# Patient Record
Sex: MALE | Race: WHITE | NOT HISPANIC OR LATINO | URBAN - METROPOLITAN AREA
[De-identification: names, ages, dates, MRNs, and addresses within clinical notes are randomized per-mention and may not be internally consistent; named-entity substitution may affect disease eponyms.]

---

## 2020-06-07 ENCOUNTER — EMERGENCY (EMERGENCY)
Facility: HOSPITAL | Age: 35
LOS: 1 days | Discharge: HOME | End: 2020-06-07
Attending: EMERGENCY MEDICINE
Payer: COMMERCIAL

## 2020-06-07 VITALS
HEART RATE: 72 BPM | SYSTOLIC BLOOD PRESSURE: 146 MMHG | TEMPERATURE: 99 F | DIASTOLIC BLOOD PRESSURE: 101 MMHG | RESPIRATION RATE: 12 BRPM | OXYGEN SATURATION: 100 %

## 2020-06-07 VITALS
OXYGEN SATURATION: 99 % | TEMPERATURE: 99 F | DIASTOLIC BLOOD PRESSURE: 120 MMHG | HEART RATE: 77 BPM | RESPIRATION RATE: 19 BRPM | WEIGHT: 300.05 LBS | HEIGHT: 68 IN | SYSTOLIC BLOOD PRESSURE: 196 MMHG

## 2020-06-07 LAB
ALBUMIN SERPL ELPH-MCNC: 4.5 G/DL — SIGNIFICANT CHANGE UP (ref 3.3–5)
ALP SERPL-CCNC: 114 U/L — SIGNIFICANT CHANGE UP (ref 40–120)
ALT FLD-CCNC: 21 U/L — SIGNIFICANT CHANGE UP (ref 10–45)
ANION GAP SERPL CALC-SCNC: 13 MMOL/L — SIGNIFICANT CHANGE UP (ref 5–17)
APTT BLD: 33.4 SEC — SIGNIFICANT CHANGE UP (ref 27.5–36.3)
AST SERPL-CCNC: 15 U/L — SIGNIFICANT CHANGE UP (ref 10–40)
BASOPHILS # BLD AUTO: 0.07 K/UL — SIGNIFICANT CHANGE UP (ref 0–0.2)
BASOPHILS NFR BLD AUTO: 0.9 % — SIGNIFICANT CHANGE UP (ref 0–2)
BILIRUB SERPL-MCNC: 0.6 MG/DL — SIGNIFICANT CHANGE UP (ref 0.2–1.2)
BUN SERPL-MCNC: 11 MG/DL — SIGNIFICANT CHANGE UP (ref 7–23)
CALCIUM SERPL-MCNC: 9 MG/DL — SIGNIFICANT CHANGE UP (ref 8.4–10.5)
CHLORIDE SERPL-SCNC: 102 MMOL/L — SIGNIFICANT CHANGE UP (ref 96–108)
CO2 SERPL-SCNC: 22 MMOL/L — SIGNIFICANT CHANGE UP (ref 22–31)
CREAT SERPL-MCNC: 0.89 MG/DL — SIGNIFICANT CHANGE UP (ref 0.5–1.3)
EOSINOPHIL # BLD AUTO: 0.14 K/UL — SIGNIFICANT CHANGE UP (ref 0–0.5)
EOSINOPHIL NFR BLD AUTO: 1.8 % — SIGNIFICANT CHANGE UP (ref 0–6)
GLUCOSE SERPL-MCNC: 102 MG/DL — HIGH (ref 70–99)
HCT VFR BLD CALC: 46.2 % — SIGNIFICANT CHANGE UP (ref 39–50)
HGB BLD-MCNC: 15.5 G/DL — SIGNIFICANT CHANGE UP (ref 13–17)
IMM GRANULOCYTES NFR BLD AUTO: 0.3 % — SIGNIFICANT CHANGE UP (ref 0–1.5)
INR BLD: 0.96 RATIO — SIGNIFICANT CHANGE UP (ref 0.88–1.16)
LYMPHOCYTES # BLD AUTO: 2.02 K/UL — SIGNIFICANT CHANGE UP (ref 1–3.3)
LYMPHOCYTES # BLD AUTO: 26.3 % — SIGNIFICANT CHANGE UP (ref 13–44)
MCHC RBC-ENTMCNC: 31.8 PG — SIGNIFICANT CHANGE UP (ref 27–34)
MCHC RBC-ENTMCNC: 33.5 GM/DL — SIGNIFICANT CHANGE UP (ref 32–36)
MCV RBC AUTO: 94.7 FL — SIGNIFICANT CHANGE UP (ref 80–100)
MONOCYTES # BLD AUTO: 0.67 K/UL — SIGNIFICANT CHANGE UP (ref 0–0.9)
MONOCYTES NFR BLD AUTO: 8.7 % — SIGNIFICANT CHANGE UP (ref 2–14)
NEUTROPHILS # BLD AUTO: 4.76 K/UL — SIGNIFICANT CHANGE UP (ref 1.8–7.4)
NEUTROPHILS NFR BLD AUTO: 62 % — SIGNIFICANT CHANGE UP (ref 43–77)
NRBC # BLD: 0 /100 WBCS — SIGNIFICANT CHANGE UP (ref 0–0)
PLATELET # BLD AUTO: 287 K/UL — SIGNIFICANT CHANGE UP (ref 150–400)
POTASSIUM SERPL-MCNC: 4.2 MMOL/L — SIGNIFICANT CHANGE UP (ref 3.5–5.3)
POTASSIUM SERPL-SCNC: 4.2 MMOL/L — SIGNIFICANT CHANGE UP (ref 3.5–5.3)
PROT SERPL-MCNC: 7.2 G/DL — SIGNIFICANT CHANGE UP (ref 6–8.3)
PROTHROM AB SERPL-ACNC: 10.9 SEC — SIGNIFICANT CHANGE UP (ref 10–12.9)
RBC # BLD: 4.88 M/UL — SIGNIFICANT CHANGE UP (ref 4.2–5.8)
RBC # FLD: 11.9 % — SIGNIFICANT CHANGE UP (ref 10.3–14.5)
SODIUM SERPL-SCNC: 137 MMOL/L — SIGNIFICANT CHANGE UP (ref 135–145)
WBC # BLD: 7.68 K/UL — SIGNIFICANT CHANGE UP (ref 3.8–10.5)
WBC # FLD AUTO: 7.68 K/UL — SIGNIFICANT CHANGE UP (ref 3.8–10.5)

## 2020-06-07 PROCEDURE — 85610 PROTHROMBIN TIME: CPT

## 2020-06-07 PROCEDURE — 70498 CT ANGIOGRAPHY NECK: CPT | Mod: 26

## 2020-06-07 PROCEDURE — 93005 ELECTROCARDIOGRAM TRACING: CPT

## 2020-06-07 PROCEDURE — 99285 EMERGENCY DEPT VISIT HI MDM: CPT

## 2020-06-07 PROCEDURE — 70496 CT ANGIOGRAPHY HEAD: CPT | Mod: 26

## 2020-06-07 PROCEDURE — 85027 COMPLETE CBC AUTOMATED: CPT

## 2020-06-07 PROCEDURE — 85730 THROMBOPLASTIN TIME PARTIAL: CPT

## 2020-06-07 PROCEDURE — 80053 COMPREHEN METABOLIC PANEL: CPT

## 2020-06-07 PROCEDURE — 70450 CT HEAD/BRAIN W/O DYE: CPT

## 2020-06-07 PROCEDURE — 99285 EMERGENCY DEPT VISIT HI MDM: CPT | Mod: 25

## 2020-06-07 PROCEDURE — 70498 CT ANGIOGRAPHY NECK: CPT

## 2020-06-07 PROCEDURE — 82962 GLUCOSE BLOOD TEST: CPT

## 2020-06-07 PROCEDURE — 70496 CT ANGIOGRAPHY HEAD: CPT

## 2020-06-07 RX ORDER — ASPIRIN/CALCIUM CARB/MAGNESIUM 324 MG
325 TABLET ORAL ONCE
Refills: 0 | Status: COMPLETED | OUTPATIENT
Start: 2020-06-07 | End: 2020-06-07

## 2020-06-07 RX ORDER — ASPIRIN/CALCIUM CARB/MAGNESIUM 324 MG
1 TABLET ORAL
Qty: 21 | Refills: 0
Start: 2020-06-07 | End: 2020-06-27

## 2020-06-07 RX ORDER — ATORVASTATIN CALCIUM 80 MG/1
1 TABLET, FILM COATED ORAL
Qty: 21 | Refills: 0
Start: 2020-06-07 | End: 2020-06-27

## 2020-06-07 RX ORDER — ASPIRIN/CALCIUM CARB/MAGNESIUM 324 MG
324 TABLET ORAL ONCE
Refills: 0 | Status: DISCONTINUED | OUTPATIENT
Start: 2020-06-07 | End: 2020-06-07

## 2020-06-07 RX ORDER — ATORVASTATIN CALCIUM 80 MG/1
80 TABLET, FILM COATED ORAL ONCE
Refills: 0 | Status: COMPLETED | OUTPATIENT
Start: 2020-06-07 | End: 2020-06-07

## 2020-06-07 RX ADMIN — Medication 325 MILLIGRAM(S): at 18:59

## 2020-06-07 RX ADMIN — ATORVASTATIN CALCIUM 80 MILLIGRAM(S): 80 TABLET, FILM COATED ORAL at 19:06

## 2020-06-07 NOTE — CONSULT NOTE ADULT - SUBJECTIVE AND OBJECTIVE BOX
NEUROLOGY CONSULT   HPI: 35 y/o R-handed man with a PMH of HTN, obesity s/p gastric bypass who presented on 06-07-20 with 1 minute of transient word finding difficulty and r arm weakness. Was at computer and suddenly noticed couldn't use his right hand and that he had difficulty speaking.  Father was former EMT and took his BP, believes it was elevated but doesn't recall number.  Never had prior episode.  Did not have HA, denied any other weakness, numbness, or any other neurologic deficit.  No persistent deficits on arrival to [facility].  NIHSS 0, CT head and CTA H&N normal.  Of note, PT does drink significantly up to 1 bottle of wine daily.     ROS: A 10-system ROS was performed and is negative except for those items noted above.     PMH: HTN    Home Medications:  None    ALLERGIES: No Known Allergies    PSH: Gastric bypass    Social History: No cigarettes, drinks daily bottle of wine, no drugs.     FH:   Cardiac disease in father, late in life    OBJECTIVE  Vital Signs Last 24 Hrs  T(C): --  T(F): --  HR: --  BP: --  BP(mean): --  RR: --  SpO2: --  I&O's Summary    PHYSICAL EXAM:  General: Morbidly obese man, appears stated age, in NAD  Cardiac: S1, S2 normal. RRR with regular pulse.  No murmurs, rubs or gallops.  Respiratory: CTA throughout with good air entry.  MSK: No erythema, tenderness or deformities.   Skin: No rashes, lesions or color changes.  Neurologic:  Mental Status: Awake, alert, oriented to person, place, situation, time. Normal affect. Follows all commands.  Language: Speech is clear, fluent with preserved naming, repetition and comprehension.    Cranial Nerves: PERRLA (R = 3mm, L = 3mm). Visual fields intact. EOMI no nystagmus. V1-3 intact to light touch.  No facial asymmetry b/l, full eye closure strength b/l. Hearing grossly normal to conversation.  Symmetric palate elevation in midline.  Head turning & shoulder shrug intact b/l. Tongue midline, normal movements, no atrophy.  Motor: Normal muscle bulk & tone. No noticeable tremor, myoclonus or pronator drift. 5/5 strength throughout	  Sensation: Symmetric B/L preserved sensation to light touch, pin prick, position.    Cortical: No extinction on double simultaneous touch and no signs of neglect.   Reflexes: 2/4 throughout  Plantar Responses are downgoing B/L.   Coordination: Intact rapid-alternating movements. No dysmetria on finger-to-nose or heel-to-shin.  No dysdiadochokinesia  Gait: Normal stance, stride length, touch off, arm swing and vincent.   Normal Romberg. No postural instability.   Psychiatric: Normal mood and congruent affect.       06/07/2020  CT Brain Stroke Protocol (06.07.20 @ 16:56)  IMPRESSION: No acute intracranial hemorrhage, mass effect or midline shift. No CT evidence for acute territorial infarct.  CT angiography neck: No evidence of hemodynamically significant stenosis by NASCET criteria. No evidence of vascular dissection.  CT angiography brain: No major vessel occlusion or proximal stenosis by NASCET criteria. No evidence of aneurysm or other vascular malformation.

## 2020-06-07 NOTE — ED ADULT NURSE NOTE - NSIMPLEMENTINTERV_GEN_ALL_ED
Implemented All Universal Safety Interventions:  [place] to call system. Call bell, personal items and telephone within reach. Instruct patient to call for assistance. Room bathroom lighting operational. Non-slip footwear when patient is off stretcher. Physically safe environment: no spills, clutter or unnecessary equipment. Stretcher in lowest position, wheels locked, appropriate side rails in place.

## 2020-06-07 NOTE — ED PROVIDER NOTE - NSFOLLOWUPINSTRUCTIONS_ED_ALL_ED_FT
Follow up neurology (see provided referral info) within 2-3 days. Call tomorrow to make an appointment.    Return to the ER for weakness, numbness, changes in speech, facial droop, vision changes, difficulty walking, or any other new concerning symptoms.    If you were prescribed any medications please refer to the package insert for complete instructions on medication use and to review potential side effects. Please call the emergency department or speak with your primary physician if you have any additional questions regarding how to use this medication.

## 2020-06-07 NOTE — ED PROVIDER NOTE - PROGRESS NOTE DETAILS
Jonathan Weil, PGY3 - CTAs negative. Patient remains asymptomatic. Neurology recommends short term outpt f/u and to start asa (load, then 81mg QD) and atorvastatin. The patient feels comfortable going home at this juncture. Results from today's visit were reviewed with the patient and a copy of the results provided for their records. We discussed the plan for home care, the need for outpatient follow up, and return precautions. The patient expressed understanding of and agreement with the plan. All questions were addressed.

## 2020-06-07 NOTE — ED PROVIDER NOTE - CARE PROVIDER_API CALL
Willy Henry  NEUROLOGY  [address] [facility] [address], [address]  [place] [address]  Phone: [phone]  Fax: [phone]  Follow Up Time:

## 2020-06-07 NOTE — ED PROVIDER NOTE - PATIENT PORTAL LINK FT
You can access the FollowMyHealth Patient Portal offered by [facility] by registering at the following website: [email or web address]. By joining [facility]’s FollowMyHealth portal, you will also be able to view your health information using other applications (apps) compatible with our system.

## 2020-06-07 NOTE — CONSULT NOTE ADULT - ASSESSMENT
INCOMPLETE  Assessment:  34y R-handed M with h/o obesity and HTN s/p gastric bypass P/W 1 min episode of anomia and R. arm weakness, now resolved and completely back to normal possibly due to TIA involving L. brain dysfunction. On exam, he has no focal neurologic deficit.     LKN: 14:00 06/07/2020  NIHSS: 0  Baseline MRS: 0  Not a tPA or thrombectomy candidate as NIHSS 0 and symptoms resolved and no LVO.   ABCD2 score:2    CT Brain Stroke Protocol (06.07.20 @ 16:56) No acute intracranial hemorrhage, mass effect or midline shift. No CT evidence for acute territorial infarct.  CT Angio Neck w/ IV Cont (06.07.20 @ 17:41)   Limited secondary to streak artifact.  CT angiography neck: No evidence of hemodynamically significant stenosis by NASCET criteria. No evidence of vascular dissection.  CT angiography brain: No major vessel occlusion or proximal stenosis by NASCET criteria. No evidence of aneurysm or other vascular malformation.    IMPRESSION: Transient anomia and R. arm weakness possibly due to L. brain dysfunction, possibly due to TIA.     Plan  Start ASA 81 daily and atorvastatin 80 daily  Outpatient MRI brain w/o contrast, TTE with bubble study, A1C, fasting lipid panel.   Outpatient follow up with Dr. Herny   Advised to follow up regarding weight loss counseling and for him to meet with his bariatric surgeon regarding how to make further lifestyle modifications.     Assessment and plan discussed with the attending, Dr. Carl Granda, DO  PGY-2 Neurology Service Assessment:  34y R-handed M with h/o obesity and HTN s/p gastric bypass P/W 1 min episode of anomia and R. arm weakness, now resolved and completely back to normal possibly due to TIA involving L. brain dysfunction. On exam, he has no focal neurologic deficit.     LKN: 14:00 06/07/2020  NIHSS: 0  Baseline MRS: 0  Not a tPA or thrombectomy candidate as NIHSS 0 and symptoms resolved and no LVO.   ABCD2 score:2    CT Brain Stroke Protocol (06.07.20 @ 16:56) No acute intracranial hemorrhage, mass effect or midline shift. No CT evidence for acute territorial infarct.  CT Angio Neck w/ IV Cont (06.07.20 @ 17:41)   Limited secondary to streak artifact.  CT angiography neck: No evidence of hemodynamically significant stenosis by NASCET criteria. No evidence of vascular dissection.  CT angiography brain: No major vessel occlusion or proximal stenosis by NASCET criteria. No evidence of aneurysm or other vascular malformation.    IMPRESSION: Transient anomia and R. arm weakness possibly due to L. brain dysfunction, possibly due to TIA.     Plan  Start ASA 81 daily and atorvastatin 80 daily  Outpatient MRI brain w/o contrast, TTE with bubble study, A1C, fasting lipid panel.   Outpatient follow up with Dr. Henry   Advised to follow up regarding weight loss counseling and for him to meet with his bariatric surgeon regarding how to make further lifestyle modifications.     Assessment and plan discussed with the attending, Dr. Carl Granda, DO  PGY-2 Neurology Service

## 2020-06-07 NOTE — ED PROVIDER NOTE - OBJECTIVE STATEMENT
Jonathan Weil, PGY3 - 34M no sig PMH p/w right arm numbness and slurred speech. Just before 2pm today (~2.75 hours ago) he felt numbness and weakness in the right arm and slurred speech with a sensation of weakness in the right side of his face. It resolved after 1-2 minutes and he has been at baseline since then. No gait disturbance or vision change. Jonathan Weil, PGY3 - 34M no sig PMH p/w right arm numbness and slurred speech. Just before 2pm today (~2.75 hours ago) he felt numbness and weakness in the right arm and slurred speech with a sensation of weakness in the right side of his face. It resolved after 1-2 minutes and he has been at baseline since then. No gait disturbance or vision change.    Attendinyo male presents with difficulty speaking and right arm weakness for about 1 minute.  occurred around 2pm today.  symptoms resolved completely prior to arrival in the ED.

## 2020-06-07 NOTE — ED ADULT NURSE NOTE - OBJECTIVE STATEMENT
34 year old male presenting to ED c/o stroke symptoms. Code stroke called at 1638. Pt A+Ox3, moves all four extremities, reports an episode of right sided weakness at around 1400. Pt reports he was laying in bed on his laptop, with sudden right upper extremity  immobility, right lower extremity weakness, and right facial droop with slurred speech. Pt reports symptoms only lasted for a minute, and resolved on own. Pt also reports taking an Aspirin, of unknown dose, at 1400 time of onset. Upon ED arrival, fingerstick 93 and pt denies headache, dizziness, lightheadedness, change in vision, weakness, chest pain, SOB, N/V, abdominal pain, urinary or bowel symptoms, fevers or chills. NIHSS score of zero. EKG and CT completed, pt attached to cardiac monitor, and transported for CTA. 34 year old male presenting to ED c/o stroke symptoms. Code stroke called at 1638. Pt A+Ox3, moves all four extremities, reports an episode of right sided weakness at around 1400. Pt reports he was laying in bed on his laptop, with sudden right upper extremity  immobility, right lower extremity weakness, and right facial droop with slurred speech. Pt reports symptoms only lasted for a minute, and resolved on own. Pt also reports taking an Aspirin, of unknown dose, at 1400 time of onset. Upon ED arrival, fingerstick 93 and pt denies headache, dizziness, lightheadedness, change in vision, weakness, chest pain, SOB, N/V, abdominal pain, urinary or bowel symptoms, fevers or chills. NIHSS score of zero. EKG and CT completed, pt attached to cardiac monitor, and transported for CTA. Please see neuro flowsheet in paper chart.

## 2020-06-07 NOTE — ED ADULT NURSE REASSESSMENT NOTE - NS ED NURSE REASSESS COMMENT FT1
Pt A+Ox3, VSS, IV removed, cleared for d/c by Weil, MD. Medication administered. Pt verbalizes understanding of d/c instructions, Aspirin and Lipitor, follow up with neuro.

## 2020-06-07 NOTE — STROKE CODE NOTE - DISPOSITION
Other/Pending final read of CTA due to possible technical error with venous phase vs. filling defect of L. ICA?

## 2021-12-29 NOTE — ED PROVIDER NOTE - INTERNATIONAL TRAVEL
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 CBD dilated but patient is s/p cholecystectomy and has no other findings concerning for obstruction.   - continue ctx while inpatient       No

## 2022-11-22 NOTE — ED PROVIDER NOTE - NS ED MD DISPO DISCHARGE
Quality 226: Preventive Care And Screening: Tobacco Use: Screening And Cessation Intervention: Tobacco Screening not Performed for Medical Reasons
Quality 402: Tobacco Use And Help With Quitting Among Adolescents: Patient screened for tobacco and never smoked
Detail Level: Detailed
Quality 431: Preventive Care And Screening: Unhealthy Alcohol Use - Screening: Patient not identified as an unhealthy alcohol user when screened for unhealthy alcohol use using a systematic screening method
Quality 130: Documentation Of Current Medications In The Medical Record: Current Medications Documented
Home

## 2024-05-13 PROBLEM — Z78.9 OTHER SPECIFIED HEALTH STATUS: Chronic | Status: ACTIVE | Noted: 2020-06-07

## 2024-05-15 ENCOUNTER — APPOINTMENT (OUTPATIENT)
Dept: HUMAN REPRODUCTION | Facility: CLINIC | Age: 39
End: 2024-05-15

## 2024-05-30 ENCOUNTER — APPOINTMENT (OUTPATIENT)
Dept: HUMAN REPRODUCTION | Facility: CLINIC | Age: 39
End: 2024-05-30
Payer: COMMERCIAL

## 2024-05-30 PROCEDURE — 36415 COLL VENOUS BLD VENIPUNCTURE: CPT

## 2024-06-06 ENCOUNTER — APPOINTMENT (OUTPATIENT)
Dept: HUMAN REPRODUCTION | Facility: CLINIC | Age: 39
End: 2024-06-06
Payer: COMMERCIAL

## 2024-06-06 PROCEDURE — 89322 SEMEN ANAL STRICT CRITERIA: CPT

## 2024-09-02 ENCOUNTER — APPOINTMENT (OUTPATIENT)
Dept: HUMAN REPRODUCTION | Facility: CLINIC | Age: 39
End: 2024-09-02
Payer: COMMERCIAL

## 2024-09-02 PROCEDURE — ZZZZZ: CPT

## 2024-09-27 ENCOUNTER — APPOINTMENT (OUTPATIENT)
Dept: HUMAN REPRODUCTION | Facility: CLINIC | Age: 39
End: 2024-09-27
Payer: COMMERCIAL

## 2024-09-27 PROCEDURE — ZZZZZ: CPT

## 2024-10-24 ENCOUNTER — APPOINTMENT (OUTPATIENT)
Dept: HUMAN REPRODUCTION | Facility: CLINIC | Age: 39
End: 2024-10-24
Payer: COMMERCIAL

## 2024-10-24 PROCEDURE — ZZZZZ: CPT

## 2024-11-21 ENCOUNTER — APPOINTMENT (OUTPATIENT)
Dept: HUMAN REPRODUCTION | Facility: CLINIC | Age: 39
End: 2024-11-21
Payer: COMMERCIAL

## 2024-11-21 PROCEDURE — ZZZZZ: CPT
